# Patient Record
Sex: MALE | Race: WHITE | Employment: FULL TIME | URBAN - NONMETROPOLITAN AREA
[De-identification: names, ages, dates, MRNs, and addresses within clinical notes are randomized per-mention and may not be internally consistent; named-entity substitution may affect disease eponyms.]

---

## 2018-07-19 ENCOUNTER — OFFICE VISIT (OUTPATIENT)
Dept: OPTOMETRY | Age: 53
End: 2018-07-19

## 2018-07-19 DIAGNOSIS — H52.4 MYOPIA OF BOTH EYES WITH ASTIGMATISM AND PRESBYOPIA: Primary | ICD-10-CM

## 2018-07-19 DIAGNOSIS — H52.13 MYOPIA OF BOTH EYES WITH ASTIGMATISM AND PRESBYOPIA: Primary | ICD-10-CM

## 2018-07-19 DIAGNOSIS — H52.203 MYOPIA OF BOTH EYES WITH ASTIGMATISM AND PRESBYOPIA: Primary | ICD-10-CM

## 2018-07-19 PROCEDURE — 92004 COMPRE OPH EXAM NEW PT 1/>: CPT | Performed by: OPTOMETRIST

## 2018-07-19 ASSESSMENT — VISUAL ACUITY
OS_CC: 20/30
METHOD: SNELLEN - LINEAR
OD_CC+: -2

## 2018-07-19 ASSESSMENT — REFRACTION_MANIFEST
OD_AXIS: 078
OS_CYLINDER: -2.25
OD_ADD: +2.00
OS_ADD: +2.00
OD_SPHERE: -2.00
OS_SPHERE: -2.00
OD_CYLINDER: -2.25
OS_AXIS: 088

## 2018-07-19 ASSESSMENT — REFRACTION_WEARINGRX
OD_ADD: +1.75
OS_SPHERE: -2.50
OS_AXIS: 088
OS_CYLINDER: -2.25
OD_AXIS: 077
OD_SPHERE: -2.25
OD_CYLINDER: -2.00
OS_ADD: 1.75

## 2018-07-19 ASSESSMENT — TONOMETRY
OD_IOP_MMHG: 14
OS_IOP_MMHG: 14
IOP_METHOD: NON-CONTACT AIR PUFF

## 2018-07-19 ASSESSMENT — KERATOMETRY
OD_K2POWER_DIOPTERS: 44.25
OD_K1POWER_DIOPTERS: 41.75
OD_AXISANGLE_DEGREES: 178
OS_AXISANGLE_DEGREES: 179
OS_AXISANGLE2_DEGREES: 89
OS_K1POWER_DIOPTERS: 42.75
OS_K2POWER_DIOPTERS: 44.50
OD_AXISANGLE2_DEGREES: 88

## 2018-07-19 ASSESSMENT — SLIT LAMP EXAM - LIDS
COMMENTS: NORMAL
COMMENTS: NORMAL

## 2018-07-19 NOTE — PROGRESS NOTES
Varsha Almendarez presents today for   Chief Complaint   Patient presents with    Vision Exam   .    HPI     Last Vision Exam: 12/9/10  Last Ophthalmology Exam: n/a  Last Filled Glasses Rx: 11  Insurance:  No insurance  Update: yes  Broke the glasses  And bought new glasses                 Main Ophthalmology Exam     External Exam       Right Left    External Normal Normal          Slit Lamp Exam       Right Left    Lids/Lashes Normal Normal    Conjunctiva/Sclera White and quiet White and quiet    Cornea Clear Clear    Anterior Chamber Deep and quiet Deep and quiet    Iris Round and reactive Round and reactive    Lens Clear Clear    Vitreous Normal Normal          Fundus Exam       Right Left    Disc Normal Normal    C/D Ratio 0.2 0.2    Macula Normal Normal    Vessels Normal Normal    Periphery Normal Normal    78 diopter                    Tonometry     Tonometry (Non-contact air puff, 4:26 PM)       Right Left    Pressure 14 14    IOP.7            12.1  CH:   9.9          9.3               Visual Acuity (Snellen - Linear)       Right Left    Dist cc 20/30 -2 20/30        Keratometry     Keratometry       K1 Axis K2 Axis    Right 41.75 88 44.25 178    Left 42.75 89 44.50 179                Ophthalmology Exam     Wearing Rx       Sphere Cylinder Axis Add    Right -2.25 -2.00 077 +1.75    Left -2.50 -2.25 088 1.75    Age:  6                Manifest Refraction     Manifest Refraction       Sphere Cylinder Axis Dist VA Add    Right -2.00 -2.25 078 20/25 +2.00    Left -2.00 -2.25 088 20/25 +2.00          Manifest Refraction #2 (Auto)       Sphere Cylinder Axis Dist VA Add    Right -1.25 -3.25 084      Left -1.75 -2.25 089                 Final Rx       Sphere Cylinder Axis Add    Right -2.00 -2.25 078 +2.00    Left -2.00 -2.25 088 +2.00    Expiration Date:  2020            1. Myopia of both eyes with astigmatism and presbyopia           Patient Instructions   New glasses recommended      Return in about 2

## 2021-07-21 ENCOUNTER — OFFICE VISIT (OUTPATIENT)
Dept: OPTOMETRY | Age: 56
End: 2021-07-21

## 2021-07-21 DIAGNOSIS — H53.8 BLURRED VISION, BILATERAL: ICD-10-CM

## 2021-07-21 DIAGNOSIS — H52.13 MYOPIA OF BOTH EYES WITH ASTIGMATISM AND PRESBYOPIA: Primary | ICD-10-CM

## 2021-07-21 DIAGNOSIS — H52.203 MYOPIA OF BOTH EYES WITH ASTIGMATISM AND PRESBYOPIA: Primary | ICD-10-CM

## 2021-07-21 DIAGNOSIS — H53.50 UNSPECIFIED COLOR VISION DEFICIENCIES: ICD-10-CM

## 2021-07-21 DIAGNOSIS — H52.4 MYOPIA OF BOTH EYES WITH ASTIGMATISM AND PRESBYOPIA: Primary | ICD-10-CM

## 2021-07-21 PROCEDURE — 92004 COMPRE OPH EXAM NEW PT 1/>: CPT | Performed by: OPTOMETRIST

## 2021-07-21 ASSESSMENT — REFRACTION_MANIFEST
OS_ADD: +2.25
OD_SPHERE: -0.75
OS_SPHERE: -1.25
OD_CYLINDER: -2.75
OD_AXIS: 082
OD_ADD: +2.25
OS_CYLINDER: -2.50
OS_AXIS: 088

## 2021-07-21 ASSESSMENT — REFRACTION_WEARINGRX
OS_ADD: +2.00
OD_CYLINDER: -2.25
OS_CYLINDER: -2.25
OS_SPHERE: -2.00
OD_ADD: +2.00
SPECS_TYPE: SVL
OS_AXIS: 088
OD_SPHERE: -2.00
OD_AXIS: 078

## 2021-07-21 ASSESSMENT — KERATOMETRY
OS_AXISANGLE_DEGREES: 006
OD_K1POWER_DIOPTERS: 42.00
OD_AXISANGLE_DEGREES: 171
OS_K2POWER_DIOPTERS: 44.50
OS_K1POWER_DIOPTERS: 43.00
OD_K2POWER_DIOPTERS: 44.25
METHOD_AUTO_MANUAL: AUTOMATED
OS_AXISANGLE2_DEGREES: 096
OD_AXISANGLE2_DEGREES: 081

## 2021-07-21 ASSESSMENT — TONOMETRY
OS_IOP_MMHG: 16
OD_IOP_MMHG: 13
IOP_METHOD: NON-CONTACT AIR PUFF

## 2021-07-21 ASSESSMENT — ENCOUNTER SYMPTOMS
EYES NEGATIVE: 0
GASTROINTESTINAL NEGATIVE: 0
ALLERGIC/IMMUNOLOGIC NEGATIVE: 0
RESPIRATORY NEGATIVE: 0

## 2021-07-21 ASSESSMENT — VISUAL ACUITY
OS_SC: 20/60
METHOD: SNELLEN - LINEAR
OD_SC: 20/80

## 2021-07-21 NOTE — PROGRESS NOTES
Tu Hubbard presents today for   Chief Complaint   Patient presents with    Vision Exam   .    HPI     Last Vision Exam: 7/19/18  Last Ophthalmology Exam: n/a  Last Filled Glasses Rx: 7/19/18  Insurance: Self pay    Update: Update glasses, broken. Vision very blurry without   5 months without the glasses           Current Outpatient Medications   Medication Sig Dispense Refill    ibuprofen (ADVIL) 200 MG tablet Take 4 tablets by mouth every 8 hours as needed for Pain 120 tablet 0     No current facility-administered medications for this visit. History reviewed. No pertinent family history. Social History     Socioeconomic History    Marital status: Single     Spouse name: None    Number of children: None    Years of education: None    Highest education level: None   Occupational History    None   Tobacco Use    Smoking status: Current Every Day Smoker     Packs/day: 0.50     Years: 25.00     Pack years: 12.50   Substance and Sexual Activity    Alcohol use: Yes     Alcohol/week: 70.0 standard drinks     Types: 70 Cans of beer per week     Comment: 3-5 cans of beer per day    Drug use: None     Comment: \"not since 1984\"    Sexual activity: None   Other Topics Concern    None   Social History Narrative    None     Social Determinants of Health     Financial Resource Strain:     Difficulty of Paying Living Expenses:    Food Insecurity:     Worried About Running Out of Food in the Last Year:     Ran Out of Food in the Last Year:    Transportation Needs:     Lack of Transportation (Medical):      Lack of Transportation (Non-Medical):    Physical Activity:     Days of Exercise per Week:     Minutes of Exercise per Session:    Stress:     Feeling of Stress :    Social Connections:     Frequency of Communication with Friends and Family:     Frequency of Social Gatherings with Friends and Family:     Attends [employer] Services:     Active Member of Clubs or Organizations:     Attends Club or vision deficiencies        PLAN:    1. New glasses recommended  2. \"  3. No treatment.   Patient understands there are some limitations to careers       Patient Instructions   Will need to take off the glasses to read if not getting bifocal      Return in about 2 years (around 7/21/2023) for complete eye exam.